# Patient Record
(demographics unavailable — no encounter records)

---

## 2024-10-30 NOTE — PROCEDURE
[FreeTextEntry1] : 5/7/24 CTA abd and pelvis with run off: stenosis of inferior mesenteric artery.  right: long segment SFA occlusion, popliteal artery occlusion. two vessel run off to foot.  left: moderate disease throughout.  two vessel run off to foot.   5/23/24 EDGARD/PVR: right 0.33, left 0.83  7/24/24 EDGARD/PVR: right 0.99, left 0.90  10/24/24 EDGARD/PVR: right 0.99, left 0.90

## 2024-10-30 NOTE — HISTORY OF PRESENT ILLNESS
[FreeTextEntry1] : 5/23/24: 87 yo female PMHx HTN, current smoker, presents with PAD and right leg pain. Pt states for the past year she has had some pain in the right calf and foot. Over the past 2 months the pain has become very severe. She can only walk 1/2 block before needing to stop due to leg pain. She denies any pain at rest. She has cramping in her right calf at night time.   7/12/24: Pt states she fell and injured her back the day after surgery. She has not been walking much due to severe pain in the back. She is complaint with ASA and Plavix.   7/19/24: Pt doing well since last visit. She is complaint with ASA and Plavix. She is walking more since last visit. She no longer has right leg pain when walking.   10/30/24: Pt doing well since last visit. She is complaint with ASA and Plavix. She is walking more since last visit. She no longer has right leg pain when walking.   PSHx:  6/26/24 RLE angiogram with SFA stenting and shockwave

## 2024-10-30 NOTE — ASSESSMENT
[FreeTextEntry1] : 86 yo female s/p RLE angiogram with SFA intervention for PAD, right SFA and popliteal artery occlusion. EDGARD/PVR before surgery was: right 0.33, left 0.83. s/p surgery EDGARD/PVR: right 0.99, left 0.90  Pt counseled on results of EDGARD/PVR and above diagnosis. Pt counseled on smoking cessation  Counseled to continue ASA and Plavix  RTC in 6 months for EDGARD/PVR   A total of 30 minutes was spent with patient and coordinating care

## 2024-12-30 NOTE — PLAN
[FreeTextEntry1] : Dyspnea, current 2 PPD smoker: - Chronic, stable - Discussed the risk of emphysema/COPD and lung cancer given patient's heavy smoking hx. Recommend CT chest to evaluate, however patient refusing. Patient aware that we are unable to treat disease without a diagnosis, which puts her at risk for potential exacerbation/worsening of underlying condition. Patient adamant that she does not want any imaging of her lungs and refuses to see a pulmonologist. - Patient aware that untreated COPD/lung cancer puts her at risk for morbidity and death. Advised patient to notify office if she changes her mind. Will continue to reevaluate at each visit. - Discussed possibility that dyspnea may be due to underlying cardiac condition as well. Recommend patient follow up with cardiologist for TTE. Patient refusing stating she does not want to see a cardiologist.  Elevated cortisol level: - Chronic, stable - Discussed options for further workup for elevated cortisol level. Patient refusing labs/imaging for further workup for elevated cortisol level. - Patient not on any exogenous steroids - Patient asymptomatic other than easy bruising, though that is most likely from ASA and Plavix use - Elevated cortisol possibly due to malnutrition given patient's BMI 18.08 (previously 17.75). Recommend continuing nutrition supplementation with Boost and well-rounded diet - Will continue to evaluate patient's willingness for repeat labs/imaging for elevated cortisol level  HTN: - Chronic, stable - BP WNL today - Patient tolerating amlodipine well with no side effects  HLD: - Chronic, stable - Patient refusing repeat lipid panel testing today - Patient tolerating rosuvastatin 5mg well with no side effects - Discussed high risk for CVD given significant smoking hx, HTN, and HLD. Patient refusing referral to cardiologist and she is aware she is high risk for cardiovascular disease, which can result in morbidity and death.  f/u 6 months or sooner PRN

## 2024-12-30 NOTE — HISTORY OF PRESENT ILLNESS
[FreeTextEntry1] : routine follow up [de-identified] : Patient is an 87-year-old female with PMH anxiety, HTN, current smoker, HLD, PAD presenting for a follow-up visit. Patient reports being unhappy that she had to come in today. Patient reports she does not want any testing done and she only wants to see a provider once a year unless she is sick. She reports she is feeling well with occasional dyspnea. She is still actively smoking 2 PPD, however she is requesting that she never again be billed for tobacco use cessation. She has not followed with a cardiologist in 3 years and has never seen a pulmonologist. She has not had lung cancer screening done and is refusing imaging of the chest to be ordered today. Regarding elevated cortisol level noted on previous labs, she is refusing further workup. She reports she feels totally fine today and does not want to be here.

## 2024-12-30 NOTE — REVIEW OF SYSTEMS
[Shortness Of Breath] : shortness of breath [Wheezing] : no wheezing [Cough] : no cough [Dyspnea on Exertion] : dyspnea on exertion [Easy Bleeding] : no easy bleeding [Easy Bruising] : easy bruising [Swollen Glands] : no swollen glands [Negative] : Psychiatric

## 2025-01-29 NOTE — ASSESSMENT
[FreeTextEntry1] : 88 yo female s/p RLE angiogram with SFA and popliteal artery stenting for rest pain and occluded SFA.   Pt counseled on surgical procedure performed  Pt counseled on smoking cessation  Counseled to continue ASA and Plavix  RTC in 6 week for RLE arterial duplex   A total of 30 minutes was spent with patient and coordinating care

## 2025-02-17 NOTE — HISTORY OF PRESENT ILLNESS
[FreeTextEntry1] : Follow up HLD [de-identified] : Patient is an 87-year-old female with PMH anxiety, HTN, current smoker, HLD, PAD s/p RLE angiogram with SFA and popliteal artery stenting, anterior tibial artery and peroneal artery angioplasty on 1/13/25 presenting for a follow up visit. Patient reports she had been compliant with ASA and Plavix, however she has continued cigarette smoking. Patient is not interested in quitting smoking. She reports she has been compliant with rosuvastatin, but wants to more aggressively manage her cholesterol to reduce her risk of future PAD complications. Patient reports she does not follow with cardiology. Patient reports baseline exertional dyspnea with borderline low O2 noted today. Patient refuses pulm referral. She has never had CT chest done.

## 2025-02-17 NOTE — PHYSICAL EXAM
[No Acute Distress] : no acute distress [Well Nourished] : well nourished [Well Developed] : well developed [Well-Appearing] : well-appearing [Normal Sclera/Conjunctiva] : normal sclera/conjunctiva [PERRL] : pupils equal round and reactive to light [EOMI] : extraocular movements intact [Normal Outer Ear/Nose] : the outer ears and nose were normal in appearance [Normal Oropharynx] : the oropharynx was normal [No JVD] : no jugular venous distention [No Lymphadenopathy] : no lymphadenopathy [Supple] : supple [Thyroid Normal, No Nodules] : the thyroid was normal and there were no nodules present [No Respiratory Distress] : no respiratory distress  [No Accessory Muscle Use] : no accessory muscle use [Clear to Auscultation] : lungs were clear to auscultation bilaterally [Normal Rate] : normal rate  [Regular Rhythm] : with a regular rhythm [Normal S1, S2] : normal S1 and S2 [No Murmur] : no murmur heard [No Carotid Bruits] : no carotid bruits [No Abdominal Bruit] : a ~M bruit was not heard ~T in the abdomen [No Varicosities] : no varicosities [No Edema] : there was no peripheral edema [No Palpable Aorta] : no palpable aorta [No Extremity Clubbing/Cyanosis] : no extremity clubbing/cyanosis [Soft] : abdomen soft [Non Tender] : non-tender [Non-distended] : non-distended [No Masses] : no abdominal mass palpated [No HSM] : no HSM [Normal Bowel Sounds] : normal bowel sounds [Normal Posterior Cervical Nodes] : no posterior cervical lymphadenopathy [Normal Anterior Cervical Nodes] : no anterior cervical lymphadenopathy [No CVA Tenderness] : no CVA  tenderness [No Spinal Tenderness] : no spinal tenderness [No Joint Swelling] : no joint swelling [Grossly Normal Strength/Tone] : grossly normal strength/tone [No Rash] : no rash [Coordination Grossly Intact] : coordination grossly intact [No Focal Deficits] : no focal deficits [Normal Gait] : normal gait [Normal Affect] : the affect was normal [Deep Tendon Reflexes (DTR)] : deep tendon reflexes were 2+ and symmetric [Normal Insight/Judgement] : insight and judgment were intact [de-identified] : RLE warm and well-perfused. Cap refill < 3 seconds. Unable to palpate pedal pulse. No vascular doppler available in office.

## 2025-02-17 NOTE — PLAN
[FreeTextEntry1] : PAD: - Chronic, stable - s/p RLE angiogram with SFA and popliteal artery stenting, anterior tibial artery and peroneal artery angioplasty on 1/13/25 - Following with vascular - Compliant with ASA and Plavix - No signs of vascular compromise on exam today - Discussed the importance of aggressive risk factor modification. Strongly urged patient to quit smoking to help reduce risk. Offered resources including nicotine replacement therapy, patient refused. - Recommend aggressive HLD management as below  HLD: - Chronic, stable - Patient reports compliance with rosuvastatin 5mg daily - Patient did not fast today. Script provided to patient to have fasting blood work done at outside lab.  - Will likely increase statin dose based on results - Recommend patient see cardiology given increased risk factors for CAD. Cardiology referral placed today. - Recommend low-fat, low-cholesterol diet  Dyspnea on exertion: - Likely 2/2 undiagnosed COPD - Patient agreeable to CT chest (ordered today) - Patient refusing pulmonary referral - Patient refusing to quit smoking - Patient aware that smoking can exacerbate dyspnea  HCM: - Patient refusing all labs except for lipid panel  f/u 3 months

## 2025-03-12 NOTE — PROCEDURE
[FreeTextEntry1] : 5/7/24 CTA abd and pelvis with run off: stenosis of inferior mesenteric artery.  right: long segment SFA occlusion, popliteal artery occlusion. two vessel run off to foot.  left: moderate disease throughout.  two vessel run off to foot.   5/23/24 EDGARD/PVR: right 0.33, left 0.83  7/24/24 EDGARD/PVR: right 0.99, left 0.90  10/24/24 EDGARD/PVR: right 0.99, left 0.90  3/12/25 RLE arterial duplex:  SFA and Pop stents patent, pta and wen patent. distal ivonne occluded, >50% stenosis in the proximal IVONNE

## 2025-03-12 NOTE — ASSESSMENT
[FreeTextEntry1] : 86 yo female s/p RLE angiogram with SFA and popliteal artery stenting for rest pain and occluded SFA. On duplex today stents are patent   Pt counseled on surgical procedure performed  Pt counseled on smoking cessation  Counseled to continue ASA and Plavix  RTC in 3 months for EDGARD/PVR  A total of 30 minutes was spent with patient and coordinating care

## 2025-03-12 NOTE — HISTORY OF PRESENT ILLNESS
[FreeTextEntry1] : 5/23/24: 87 yo female PMHx HTN, current smoker, presents with PAD and right leg pain. Pt states for the past year she has had some pain in the right calf and foot. Over the past 2 months the pain has become very severe. She can only walk 1/2 block before needing to stop due to leg pain. She denies any pain at rest. She has cramping in her right calf at night time.   7/12/24: Pt states she fell and injured her back the day after surgery. She has not been walking much due to severe pain in the back. She is complaint with ASA and Plavix.   7/19/24: Pt doing well since last visit. She is complaint with ASA and Plavix. She is walking more since last visit. She no longer has right leg pain when walking.   10/30/24: Pt doing well since last visit. She is complaint with ASA and Plavix. She is walking more since last visit. She no longer has right leg pain when walking.   1/29/25: Pt is s/p RLE angiogram with SFA and popliteal artery stenting. Anterior tibial artery and peroneal artery angioplasty. She went to the ER complaining of rest pain on 1/12/25 and was told she had an occluded stent. She is still smoking. She is compliant with ASA and Plavix.   3/12/25: Pt doing well since last visit. Denies any new pain in legs. She is compliant with ASA and Plavix. She is still smoking.   PSHx:  1/13/25 RLE angiogram with SFA and popliteal artery stenting. Anterior tibial artery and peroneal artery angioplasty  6/26/24 RLE angiogram with SFA stenting and shockwave

## 2025-06-18 NOTE — PROCEDURE
[FreeTextEntry1] : 5/7/24 CTA abd and pelvis with run off: stenosis of inferior mesenteric artery.  right: long segment SFA occlusion, popliteal artery occlusion. two vessel run off to foot.  left: moderate disease throughout.  two vessel run off to foot.   5/23/24 EDGARD/PVR: right 0.33, left 0.83  7/24/24 EDGARD/PVR: right 0.99, left 0.90  10/24/24 EDGARD/PVR: right 0.99, left 0.90  3/12/25 RLE arterial duplex:  SFA and Pop stents patent, pta and wen patent. distal ivonne occluded, >50% stenosis in the proximal IVONNE  6/18/25 EDGARD/PVR: right 0.85, left 0.76

## 2025-06-18 NOTE — ASSESSMENT
[FreeTextEntry1] : 88 yo female s/p RLE angiogram with SFA and popliteal artery stenting for rest pain and occluded SFA.   Pt counseled on surgical procedure performed  Pt counseled on smoking cessation  Counseled to continue ASA and Plavix  RTC in 6 months for EDGARD/PVR

## 2025-06-18 NOTE — HISTORY OF PRESENT ILLNESS
[FreeTextEntry1] : 5/23/24: 87 yo female PMHx HTN, current smoker, presents with PAD and right leg pain. Pt states for the past year she has had some pain in the right calf and foot. Over the past 2 months the pain has become very severe. She can only walk 1/2 block before needing to stop due to leg pain. She denies any pain at rest. She has cramping in her right calf at night time.   7/12/24: Pt states she fell and injured her back the day after surgery. She has not been walking much due to severe pain in the back. She is complaint with ASA and Plavix.   7/19/24: Pt doing well since last visit. She is complaint with ASA and Plavix. She is walking more since last visit. She no longer has right leg pain when walking.   10/30/24: Pt doing well since last visit. She is complaint with ASA and Plavix. She is walking more since last visit. She no longer has right leg pain when walking.   1/29/25: Pt is s/p RLE angiogram with SFA and popliteal artery stenting. Anterior tibial artery and peroneal artery angioplasty. She went to the ER complaining of rest pain on 1/12/25 and was told she had an occluded stent. She is still smoking. She is compliant with ASA and Plavix.   3/12/25: Pt doing well since last visit. Denies any new pain in legs. She is compliant with ASA and Plavix. She is still smoking.   6/18/25: Pt doing well since last visit. Denies any new pain in legs. She is compliant with ASA and Plavix. She is still smoking.   PSHx:  1/13/25 RLE angiogram with SFA and popliteal artery stenting. Anterior tibial artery and peroneal artery angioplasty  6/26/24 RLE angiogram with SFA stenting and shockwave

## 2025-07-18 NOTE — HISTORY OF PRESENT ILLNESS
[FreeTextEntry1] : RPA [de-identified] : Patient is an 87-year-old female with PMH anxiety, osteoporosis, HTN, current smoker, HLD, PAD s/p RLE angiogram with SFA and popliteal artery stenting, anterior tibial artery and peroneal artery angioplasty on 1/13/25 presenting for a follow up visit. Patient reports she never picked up Anoro as it was too expensive. Patient states she does not want an alternative and only wants to use Albuterol PRN. She states she only uses it once in a while. She is refusing to see pulmonology for COPD. Patient is still smoking 1 PPD and does not want to quit smoking. She is aware that smoking can exacerbate her emphysema and put her at increased risk for lung cancer. In March, patient was recommended for US thyroid. Patient reports a hx thyroid nodule that was biopsied and reportedly was negative. Patient is unsure of the location of the nodule. Discussed importance of US thyroid + parathyroid given significant size of the nodule, however patient refusing to go for US thyroid. Patient was also recommended for DEXA as CT chest showed osteoporosis with numerous vertebral compression fracture deformities. Patient has refused DEXA scans and states she has had one in the past that showed osteoporosis. She was placed on bisphosphonates, but couldn't tolerate them. She states that she refuses a DEXA scan as she will refuse treatment for osteoporosis anyway. She is currently taking vitamin D3 1000 IU, but is refusing to take calcium supplementation. Patient reports she has not seen cardiology and refuses to do so. Patient is requesting handicap permit to be filled out.

## 2025-07-18 NOTE — PLAN
[FreeTextEntry1] : Emphysema: - Chronic, stable - Patient could not afford Anoro. Offered to send an alternative, patient refused and states she only wants to use albuterol inhaler - Again recommended patient see pulmonology for abnormal CT chest, patient refuses - Patient currently smoking 1 PPD. Discussed that smoking is exacerbating COPD and increasing risk for lung cancer. Patient verbalized understanding and refuses to quit smoking  Coronary calcifications: - Chronic, stable - Patient is on rosuvastatin 5mg - Strongly advised patient see cardiology due to increased risk for heart attack and stroke, however patient refusing - Recommend repeating lipid panel today, patient refusing  Osteoporosis: - Chronic, stable - Patient with multiple compression fractures in the back - Strongly recommend patient go for DEXA due to osteoporosis. She is not currently on treatment - Patient refusing DEXA scan and treatment for osteoporosis.  - Discussed that she is at very high risk for hip/spine fractures, which can lead to morbidities such as PNA/immobility/frailty, which can lead to death. Patient verbalized understanding of risk and is still refusing treatment for osteoporosis. - Handicap permit form completed today for multiple compression fractures of the lumbar spine  Thyroid nodule: - Chronic, stable - CT chest showed 2.5 x 1.5 cm posterior right thyroid nodule noted. Patient reports a hx thyroid nodule that was biopsied and reportedly was negative. - Discussed importance of US thyroid + parathyroid given significant size of the nodule, however patient refusing - Patient aware that without surveillance, it is possible the nodule is malignant. Refusing US may delay the diagnosis of cancer at which point it may spread and be more difficult/impossible to treat. Patient verbalized understanding  HCM: - Recommend routine labs. Patient refusing.